# Patient Record
Sex: FEMALE | Race: WHITE | NOT HISPANIC OR LATINO | Employment: FULL TIME | URBAN - METROPOLITAN AREA
[De-identification: names, ages, dates, MRNs, and addresses within clinical notes are randomized per-mention and may not be internally consistent; named-entity substitution may affect disease eponyms.]

---

## 2017-07-26 ENCOUNTER — LAB REQUISITION (OUTPATIENT)
Dept: LAB | Facility: HOSPITAL | Age: 37
End: 2017-07-26
Payer: COMMERCIAL

## 2017-07-26 DIAGNOSIS — Z01.419 ENCOUNTER FOR GYNECOLOGICAL EXAMINATION WITHOUT ABNORMAL FINDING: ICD-10-CM

## 2017-07-26 PROCEDURE — G0145 SCR C/V CYTO,THINLAYER,RESCR: HCPCS | Performed by: OBSTETRICS & GYNECOLOGY

## 2017-08-02 LAB
LAB AP GYN PRIMARY INTERPRETATION: NORMAL
Lab: NORMAL

## 2017-08-20 ENCOUNTER — ANESTHESIA EVENT (OUTPATIENT)
Dept: PERIOP | Facility: HOSPITAL | Age: 37
End: 2017-08-20
Payer: COMMERCIAL

## 2017-08-21 ENCOUNTER — ANESTHESIA (OUTPATIENT)
Dept: PERIOP | Facility: HOSPITAL | Age: 37
End: 2017-08-21
Payer: COMMERCIAL

## 2017-08-21 ENCOUNTER — HOSPITAL ENCOUNTER (OUTPATIENT)
Facility: HOSPITAL | Age: 37
Setting detail: OUTPATIENT SURGERY
Discharge: HOME | End: 2017-08-21
Attending: OBSTETRICS & GYNECOLOGY | Admitting: OBSTETRICS & GYNECOLOGY
Payer: COMMERCIAL

## 2017-08-21 VITALS
TEMPERATURE: 97.5 F | SYSTOLIC BLOOD PRESSURE: 95 MMHG | HEIGHT: 63 IN | WEIGHT: 122 LBS | RESPIRATION RATE: 16 BRPM | HEART RATE: 59 BPM | OXYGEN SATURATION: 100 % | DIASTOLIC BLOOD PRESSURE: 56 MMHG | BODY MASS INDEX: 21.62 KG/M2

## 2017-08-21 DIAGNOSIS — Z30.2 ENCOUNTER FOR STERILIZATION: ICD-10-CM

## 2017-08-21 LAB — EXT PREGNANCY TEST URINE: NEGATIVE

## 2017-08-21 PROCEDURE — 88302 TISSUE EXAM BY PATHOLOGIST: CPT | Performed by: OBSTETRICS & GYNECOLOGY

## 2017-08-21 PROCEDURE — 81025 URINE PREGNANCY TEST: CPT | Performed by: ANESTHESIOLOGY

## 2017-08-21 RX ORDER — OXYCODONE HYDROCHLORIDE AND ACETAMINOPHEN 5; 325 MG/1; MG/1
1-2 TABLET ORAL EVERY 4 HOURS PRN
Qty: 20 TABLET | Refills: 0 | Status: SHIPPED | OUTPATIENT
Start: 2017-08-21 | End: 2017-08-31

## 2017-08-21 RX ORDER — KETOROLAC TROMETHAMINE 30 MG/ML
INJECTION, SOLUTION INTRAMUSCULAR; INTRAVENOUS AS NEEDED
Status: DISCONTINUED | OUTPATIENT
Start: 2017-08-21 | End: 2017-08-21 | Stop reason: SURG

## 2017-08-21 RX ORDER — ROCURONIUM BROMIDE 10 MG/ML
INJECTION, SOLUTION INTRAVENOUS AS NEEDED
Status: DISCONTINUED | OUTPATIENT
Start: 2017-08-21 | End: 2017-08-21 | Stop reason: SURG

## 2017-08-21 RX ORDER — GLYCOPYRROLATE 0.2 MG/ML
INJECTION INTRAMUSCULAR; INTRAVENOUS AS NEEDED
Status: DISCONTINUED | OUTPATIENT
Start: 2017-08-21 | End: 2017-08-21 | Stop reason: SURG

## 2017-08-21 RX ORDER — SODIUM CHLORIDE 9 MG/ML
125 INJECTION, SOLUTION INTRAVENOUS CONTINUOUS
Status: DISCONTINUED | OUTPATIENT
Start: 2017-08-21 | End: 2017-08-21 | Stop reason: HOSPADM

## 2017-08-21 RX ORDER — FENTANYL CITRATE/PF 50 MCG/ML
50 SYRINGE (ML) INJECTION
Status: DISCONTINUED | OUTPATIENT
Start: 2017-08-21 | End: 2017-08-21 | Stop reason: HOSPADM

## 2017-08-21 RX ORDER — PROPOFOL 10 MG/ML
INJECTION, EMULSION INTRAVENOUS AS NEEDED
Status: DISCONTINUED | OUTPATIENT
Start: 2017-08-21 | End: 2017-08-21 | Stop reason: SURG

## 2017-08-21 RX ORDER — ONDANSETRON 2 MG/ML
INJECTION INTRAMUSCULAR; INTRAVENOUS AS NEEDED
Status: DISCONTINUED | OUTPATIENT
Start: 2017-08-21 | End: 2017-08-21 | Stop reason: SURG

## 2017-08-21 RX ORDER — ONDANSETRON 2 MG/ML
4 INJECTION INTRAMUSCULAR; INTRAVENOUS EVERY 6 HOURS PRN
Status: DISCONTINUED | OUTPATIENT
Start: 2017-08-21 | End: 2017-08-21 | Stop reason: HOSPADM

## 2017-08-21 RX ORDER — FENTANYL CITRATE 50 UG/ML
INJECTION, SOLUTION INTRAMUSCULAR; INTRAVENOUS AS NEEDED
Status: DISCONTINUED | OUTPATIENT
Start: 2017-08-21 | End: 2017-08-21 | Stop reason: SURG

## 2017-08-21 RX ORDER — OXYCODONE HYDROCHLORIDE AND ACETAMINOPHEN 5; 325 MG/1; MG/1
2 TABLET ORAL EVERY 4 HOURS PRN
Status: DISCONTINUED | OUTPATIENT
Start: 2017-08-21 | End: 2017-08-21 | Stop reason: HOSPADM

## 2017-08-21 RX ORDER — OXYCODONE HYDROCHLORIDE AND ACETAMINOPHEN 5; 325 MG/1; MG/1
1 TABLET ORAL EVERY 4 HOURS PRN
Status: DISCONTINUED | OUTPATIENT
Start: 2017-08-21 | End: 2017-08-21 | Stop reason: HOSPADM

## 2017-08-21 RX ORDER — LIDOCAINE HYDROCHLORIDE 10 MG/ML
INJECTION, SOLUTION INFILTRATION; PERINEURAL AS NEEDED
Status: DISCONTINUED | OUTPATIENT
Start: 2017-08-21 | End: 2017-08-21 | Stop reason: SURG

## 2017-08-21 RX ORDER — IBUPROFEN 600 MG/1
600 TABLET ORAL EVERY 6 HOURS PRN
Status: DISCONTINUED | OUTPATIENT
Start: 2017-08-21 | End: 2017-08-21 | Stop reason: HOSPADM

## 2017-08-21 RX ORDER — MEPERIDINE HYDROCHLORIDE 50 MG/ML
12.5 INJECTION INTRAMUSCULAR; INTRAVENOUS; SUBCUTANEOUS ONCE AS NEEDED
Status: DISCONTINUED | OUTPATIENT
Start: 2017-08-21 | End: 2017-08-21 | Stop reason: HOSPADM

## 2017-08-21 RX ORDER — ONDANSETRON 2 MG/ML
4 INJECTION INTRAMUSCULAR; INTRAVENOUS ONCE AS NEEDED
Status: DISCONTINUED | OUTPATIENT
Start: 2017-08-21 | End: 2017-08-21 | Stop reason: HOSPADM

## 2017-08-21 RX ORDER — BUPIVACAINE HYDROCHLORIDE 5 MG/ML
INJECTION, SOLUTION PERINEURAL AS NEEDED
Status: DISCONTINUED | OUTPATIENT
Start: 2017-08-21 | End: 2017-08-21 | Stop reason: HOSPADM

## 2017-08-21 RX ORDER — MIDAZOLAM HYDROCHLORIDE 1 MG/ML
INJECTION INTRAMUSCULAR; INTRAVENOUS AS NEEDED
Status: DISCONTINUED | OUTPATIENT
Start: 2017-08-21 | End: 2017-08-21 | Stop reason: SURG

## 2017-08-21 RX ORDER — IBUPROFEN 600 MG/1
600 TABLET ORAL EVERY 6 HOURS PRN
Qty: 30 TABLET | Refills: 0 | Status: SHIPPED | OUTPATIENT
Start: 2017-08-21 | End: 2018-05-20

## 2017-08-21 RX ADMIN — ONDANSETRON HYDROCHLORIDE 4 MG: 2 INJECTION, SOLUTION INTRAVENOUS at 13:10

## 2017-08-21 RX ADMIN — IBUPROFEN 600 MG: 600 TABLET, FILM COATED ORAL at 17:01

## 2017-08-21 RX ADMIN — SODIUM CHLORIDE 125 ML/HR: 0.9 INJECTION, SOLUTION INTRAVENOUS at 15:36

## 2017-08-21 RX ADMIN — GLYCOPYRROLATE 0.8 MG: 0.2 INJECTION, SOLUTION INTRAMUSCULAR; INTRAVENOUS at 14:03

## 2017-08-21 RX ADMIN — FENTANYL CITRATE 50 MCG: 50 INJECTION INTRAMUSCULAR; INTRAVENOUS at 14:31

## 2017-08-21 RX ADMIN — MIDAZOLAM HYDROCHLORIDE 2 MG: 1 INJECTION, SOLUTION INTRAMUSCULAR; INTRAVENOUS at 12:49

## 2017-08-21 RX ADMIN — NEOSTIGMINE METHYLSULFATE 4 MG: 1 INJECTION, SOLUTION INTRAMUSCULAR; INTRAVENOUS; SUBCUTANEOUS at 14:03

## 2017-08-21 RX ADMIN — ONDANSETRON 4 MG: 2 INJECTION INTRAMUSCULAR; INTRAVENOUS at 15:09

## 2017-08-21 RX ADMIN — KETOROLAC TROMETHAMINE 30 MG: 30 INJECTION, SOLUTION INTRAMUSCULAR at 13:52

## 2017-08-21 RX ADMIN — DEXAMETHASONE SODIUM PHOSPHATE 4 MG: 10 INJECTION INTRAMUSCULAR; INTRAVENOUS at 13:10

## 2017-08-21 RX ADMIN — SODIUM CHLORIDE: 0.9 INJECTION, SOLUTION INTRAVENOUS at 13:16

## 2017-08-21 RX ADMIN — FENTANYL CITRATE 50 MCG: 50 INJECTION INTRAMUSCULAR; INTRAVENOUS at 14:24

## 2017-08-21 RX ADMIN — FENTANYL CITRATE 100 MCG: 50 INJECTION, SOLUTION INTRAMUSCULAR; INTRAVENOUS at 12:59

## 2017-08-21 RX ADMIN — PROPOFOL 200 MG: 10 INJECTION, EMULSION INTRAVENOUS at 12:59

## 2017-08-21 RX ADMIN — ROCURONIUM BROMIDE 50 MG: 10 INJECTION, SOLUTION INTRAVENOUS at 12:59

## 2017-08-21 RX ADMIN — LIDOCAINE HYDROCHLORIDE 60 MG: 10 INJECTION, SOLUTION INFILTRATION; PERINEURAL at 12:59

## 2017-08-21 RX ADMIN — SODIUM CHLORIDE 125 ML/HR: 0.9 INJECTION, SOLUTION INTRAVENOUS at 10:24

## 2017-09-20 ENCOUNTER — TRANSCRIBE ORDERS (OUTPATIENT)
Dept: ADMINISTRATIVE | Facility: HOSPITAL | Age: 37
End: 2017-09-20

## 2017-09-20 DIAGNOSIS — C77.9: Primary | ICD-10-CM

## 2017-09-20 DIAGNOSIS — C50.912: Primary | ICD-10-CM

## 2017-09-26 ENCOUNTER — HOSPITAL ENCOUNTER (OUTPATIENT)
Dept: RADIOLOGY | Facility: HOSPITAL | Age: 37
Discharge: HOME | End: 2017-09-26
Attending: OBSTETRICS & GYNECOLOGY
Payer: COMMERCIAL

## 2017-09-26 ENCOUNTER — DOCTOR'S OFFICE (OUTPATIENT)
Dept: URBAN - METROPOLITAN AREA CLINIC 137 | Facility: CLINIC | Age: 37
Setting detail: OPHTHALMOLOGY
End: 2017-09-26
Payer: COMMERCIAL

## 2017-09-26 DIAGNOSIS — C77.9: ICD-10-CM

## 2017-09-26 DIAGNOSIS — H52.13: ICD-10-CM

## 2017-09-26 DIAGNOSIS — H52.222: ICD-10-CM

## 2017-09-26 DIAGNOSIS — C50.912: ICD-10-CM

## 2017-09-26 PROCEDURE — 76856 US EXAM PELVIC COMPLETE: CPT

## 2017-09-26 PROCEDURE — 76830 TRANSVAGINAL US NON-OB: CPT

## 2017-09-26 PROCEDURE — 92004 COMPRE OPH EXAM NEW PT 1/>: CPT | Performed by: OPHTHALMOLOGY

## 2017-09-26 ASSESSMENT — REFRACTION_OUTSIDERX
OS_VA2: 20/20(J1+)
OU_VA: 20/
OS_VA1: 20/20
OD_VA1: 20/20
OD_VA3: 20/
OD_CYLINDER: SPH
OS_CYLINDER: +1.25
OD_VA2: 20/20(J1+)
OD_SPHERE: +0.25
OS_SPHERE: -0.75
OS_AXIS: 090
OS_VA3: 20/

## 2017-09-26 ASSESSMENT — REFRACTION_MANIFEST
OS_VA1: 20/
OS_VA2: 20/
OS_VA1: 20/
OS_VA3: 20/
OD_VA2: 20/
OD_VA3: 20/
OD_VA3: 20/
OS_VA2: 20/
OU_VA: 20/
OU_VA: 20/
OS_VA3: 20/
OD_VA1: 20/
OD_VA2: 20/
OD_VA1: 20/

## 2017-09-26 ASSESSMENT — CONFRONTATIONAL VISUAL FIELD TEST (CVF)
OS_FINDINGS: FULL
OD_FINDINGS: FULL

## 2017-09-26 ASSESSMENT — REFRACTION_CURRENTRX
OD_OVR_VA: 20/
OD_OVR_VA: 20/
OS_OVR_VA: 20/
OD_OVR_VA: 20/

## 2017-09-26 ASSESSMENT — KERATOMETRY
OS_K1POWER_DIOPTERS: 44.50
OD_AXISANGLE_DEGREES: 088
OD_K1POWER_DIOPTERS: 44.75
OS_K2POWER_DIOPTERS: 46.25
OD_K2POWER_DIOPTERS: 46.00
OS_AXISANGLE_DEGREES: 086

## 2017-09-26 ASSESSMENT — SPHEQUIV_DERIVED: OS_SPHEQUIV: 0

## 2017-09-26 ASSESSMENT — REFRACTION_AUTOREFRACTION
OD_SPHERE: +0.25
OS_CYLINDER: +1.50
OD_CYLINDER: SPH
OS_AXIS: 086
OS_SPHERE: -0.75

## 2017-09-26 ASSESSMENT — VISUAL ACUITY
OD_BCVA: 20/40
OS_BCVA: 20/20

## 2017-09-26 ASSESSMENT — AXIALLENGTH_DERIVED: OS_AL: 22.9227

## 2018-01-11 NOTE — MISCELLANEOUS
Message   Recorded as Task   Date: 04/26/2016 09:53 PM, Created By: Prosper Velasquez   Task Name: Call Patient with results   Assigned To: Lala Welch   Regarding Patient: Adán Robles, Status: Active   Comment:    Lala Welch - 26 Apr 2016 9:53 PM     Patient Phone: [phone]      Pt needs a 3 hr GTT, 1 hr was 136 and hx of GDM  Plts at 132 ( normal 130-400) for this lab  Please call pt  to get 3 hr GTT, order in [employer], please see care coordination note, pt may be transferring out  Thanks   FroilanJan - 28 Apr 2016 11:04 AM     TASK EDITED  L/M for pt to have 3 HR GTT done  lab slip mailed to pt with instructions  Active Problems    1  Abnormal glucose tolerance test (790 22) (R73 02)   2  Amenorrhea (626 0) (N91 2)   3  Supervision of normal pregnancy in first trimester (V22 1) (Z34 81)    Current Meds   1  Prenatal TABS; Therapy: (Recorded:20Apr2016) to Recorded    Allergies    1   No Known Drug Allergies    Signatures   Electronically signed by : Venu Escobedo RN; Apr 28 2016 11:04AM EST                       (Author)

## 2018-01-15 NOTE — PROGRESS NOTES
2016         RE: Nina Ferro                                    To: Noreen RAVEN Mccloud    MR#: [id number]   : [address]: [id number]                             Fax: [phone]   (Exam #: [id number])      The LMP of this 30year old,  G3, P1-0-1-1 patient was unknown, her   working JOSE is DEC 6 2016 and the current gestational age is 17 weeks 3   days by 1st Trimester Sono  A sonographic examination was performed on 2016 using real time equipment  The ultrasound examination was performed   using abdominal technique  The patient has a BMI of 21 6  Her blood   pressure today was 121/80  Multiple longitudinal and transverse sections revealed a waggoner   intrauterine pregnancy with the fetus in transverse presentation  The   placenta is anterior in implantation, grade 0 in appearance  Cardiac motion was observed at 162 bpm       INDICATIONS      first trimester genetic screening   short interval pregnancy   diabetes, gestation-previous pregnancy   advanced maternal age      Exam Types      Level I      RESULTS      Fetus # 1 of 1   Transverse presentation   Fetal growth appeared normal      MEASUREMENTS (* Included In Average GA)      CRL              8 2 cm        13 weeks 6 days*   Nuchal Trans    1 70 mm      THE AVERAGE GESTATIONAL AGE is 13 weeks 6 days +/- 7 days  UTERINE ARTERIES                                  S/D   PI    RI    NOTCH       Left Uterine Artery        2 25  0 93  0 56       Right Uterine Artery       2 46  1 00  0 59      ANATOMY COMMENTS      Anatomic detail is limited at this gestational age  The yolk sac was not   noted  The fetal cranium appeared normal in shape and the nuchal   translucency was normal in size (1 7mm)  The nasal bone appears to be   present  The intracranial anatomy was unremarkable  Evaluation of the   spine revealed no obvious evidence for a neural tube defect    Anatomy of   the fetal thorax appeared within normal limits  The cardiac rhythm was   regular  Within the abdomen, stomach & bladder were visualized and the   abdominal wall appeared intact  A three vessel cord appears to be present  Active movement of the fetal body & extremities was seen  There is no   suspicion of a subchorionic bleed  The placental cord insertion was   normal    The uterine artery Dopplers are normal for this gestational age  There is no suspicion of a uterine myoma  Free fluid is not seen in the   posterior cul-de-sac  ADNEXA      The left ovary appeared normal and measured 3 5 x 2 7 x 1 6 cm with a   volume of 7 9 cc  The right ovary appeared normal and measured 2 5 x 1 7 x   0 9 cm with a volume of 2 0 cc       AMNIOTIC FLUID      Amniotic Fluid: Normal      IMPRESSION      Landon IUP   13 weeks and 6 days by this ultrasound  (JOSE=DEC 8 2016)   13 weeks and 3 days by 1st Tri Sono  (JOSE=DEC 11 2016)   Transverse presentation   Fetal growth appeared normal   Regular fetal heart rate of 162 bpm   Anterior placenta      GENERAL COMMENT      I had the pleasure of seeing Rhiannon Contreras in the  Center on    for a sequential screen  She is a 18-year-old  3 para 26 with a   working due date of   She will be 29years of age at the time   of delivery  She has a history of gestational diabetes with her last   pregnancy  She has a short interpregnancy interval of less than one year  She denies any other medical surgical or family problems  She did have a   vaginal delivery in 2015 without any complications she denies any   tobacco or alcohol use and denies any medication allergies  Her pregnancy   has been uncomplicated to date  She denies any pregnancy complications   such as vaginal bleeding, leakage of fluid or contractions  Her BMI is 21  Her blood pressure today was 121/80 mmHg        RISK FACTORS: History of gestational diabetes, short interpregnancy   interval, advanced maternal age Today's ultrasound showed a viable fetus in a variable presentation  The   amniotic fluid and overall fetal growth appeared normal  The placenta is   anterior  in location and there is no evidence of a placenta previa  There   were no obvious anomalies seen in the fetus today  Down syndrome screening   was reassuring as the nuchal translucency was normal in size and the nasal   bone was present  The uterine artery Doppler flow studies were normal   bilaterally  Maternal ovaries are seen bilaterally and appeared normal    There were no subchorionic hematomas or uterine myomas  The patient appears well-nourished, well-developed and oriented to person   place and time  Her abdomen was soft and nontender  The uterus was gravid   and nontender  The fetal cranium was intact  There was a normal midline to   the fetal cranium, and the choroid plexi were seen bilaterally  A fetal   heart, a fetal stomach, and the fetal bladder were all seen today and   appeared unremarkable  The ventral wall appeared intact with no evidence   of an abdominal wall defect  There was a normal abdominal cord insertion   seen  All extremities were seen including 2 arms and 2 legs  The spine   showed no obvious neural tube defect  The fetal heart rate and rhythm were   within normal range  Active fetal movements were seen today  She opted for the noninvasive prenatal test today which has a sensitivity   of over 99%  IMPORTANT NEW FINDINGS ON TODAY'S ULTRASOUND: None, reassuring ultrasound   today in the  [address] [place] [address] Rd: Lisa Joiner had a reassuring ultrasound today  The fetus appears to   be growing well  There were no obvious birth defects  The Down syndrome   screening was reassuring as the nuchal translucency measured 1 7 mm in an   enlarged midsagittal plane and the nasal bone was present  The uterine   artery Doppler flow studies were normal as well   She did go on to have the   noninvasive prenatal test performed today as well  She will return in 7   weeks for her full level II ultrasound  Thank you kindly for this referral          Total face-to-face time with the patient, excluding ultrasound time was 15   minutes with more than 50% of the time devoted to counseling and   coordination of care  Thank you very much for allowing me to participate in the care of your   patient  If you have any questions or concerns about today's visit, please   do not hesitate to call me  Sincerely,      RAVEN Ruiz  Maternal Fetal Medicine      CAT Wells M D     Maternal-Fetal Medicine   Electronically signed 06/08/16 15:20

## 2018-01-16 NOTE — RESULT NOTES
Message   Pt needs a 3 hr GTT, 1 hr was 136 and hx of GDM  Plts at 132 ( normal 130-400) for this lab  Please call pt  to get 3 hr GTT, order in [employer], please see care coordination note, pt may be transferring out  Thanks     Verified Results  (1) PRENATAL PANEL [id number] 01:05PM Estefanía Arzate     Test Name Result Flag Reference   BASOPHILS ABSOLUTE COUNT 0 00 Thousands/µL  0 00-0 10   EOSINOPHILS ABSOLUTE COUNT 0 00 Thousand/µL  0 00-0 61   LYMPHOCYTES ABSOLUTE COUNT 1 90 Thousands/µL  0 60-4 47   MONOCYTES ABSOLUTE COUNT 0 30 Thousand/µL  0 17-1 22   NEUTROPHILS ABSOLUTE COUNT 3 50 Thousands/µL  1 85-7 62   BASOPHILS RELATIVE PERCENT 0 %  0-1   EOSINOPHILS RELATIVE PERCENT 1 %  0-6   HEMATOCRIT 35 6 % L 37 0-47 0   HEMOGLOBIN 11 9 g/dL L 12 0-16 0   LYMPHOCYTES RELATIVE PERCENT 33 %  14-44   MCH 28 3 pg  27 0-31 0   MCHC 33 6 g/dL  31 4-37 4   MCV 84 fL  82-98   MONOCYTES RELATIVE PERCENT 5 %  4-12   nRBC AUTOMATED 0 /100 WBCs     PLATELET COUNT 729 Thousands/uL  130-400   MPV 10 6 fL  8 9-12 7   RBC COUNT 4 22 Million/uL  4 20-5  40   RDW 13 4 %  11 6-15 1   NEUTROPHILS RELATIVE PERCENT 61 %  43-75   WBC COUNT 5 70 Thousand/uL  4 80-10 80   WBC ADJUSTED 5 70 Thousand/uL  4 80-10 80       Signatures   Electronically signed by : CESAR Orozco;  Apr 26 2016  9:53PM EST                       (Author)

## 2018-01-17 NOTE — PROGRESS NOTES
2016         RE: Ann Portillo                                    To: RAVEN Curtis    MR#: [id number]   : [address]: [id number]                             Fax: [phone]   (Exam #: [id number])      The LMP of this 29year old,  G3, P1-0-1-1 patient was unknown, her   working JOSE is DEC 6 2016 and the current gestational age is 25 weeks 1   day by [address] [place] [address] [address]  A sonographic examination was performed on 2016 using real time equipment  The ultrasound examination was   performed using abdominal & vaginal techniques  The patient has a BMI of   22 5  Her blood pressure today was 100/56        Earliest ultrasound found in her record: 16  7w3d 16EDD      Cardiac motion was observed at 157 bpm       INDICATIONS      short interval pregnancy   diabetes, gestation-previous pregnancy   advanced maternal age   fetal anatomical survey      Exam Types      Transvaginal   LEVEL II      RESULTS      Fetus # 1 of 1   Vertex presentation   Fetal growth appeared normal   Placenta Location = Anterior   No placenta previa   Placenta Grade = II      MEASUREMENTS (* Included In Average GA)      AC              16 3 cm        21 weeks 1 day * (72%)   BPD              5 2 cm        21 weeks 5 days* (93%)   HC              19 2 cm        21 weeks 2 days* (83%)   Femur            3 4 cm        21 weeks 0 days* (61%)      Nuchal Fold      4 1 mm      Humerus          3 4 cm        21 weeks 4 days  (85%)   Radius           2 9 cm        22 weeks 1 day   Ulna             3 1 cm        21 weeks 6 days   Tibia            3 1 cm        21 weeks 2 days  (83%)   Fibula           2 9 cm        19 weeks 6 days   Foot             3 5 cm        20 weeks 6 days      Cerebellum       2 2 cm        21 weeks 1 day   Biorbit          3 3 cm        21 weeks 2 days   CisternaMagna    7 4 mm      HC/AC           1 18   FL/AC           0 21   FL/BPD          0 67   EFW (Ac/Fl/Hc)   404 grams - 0 lbs 14 oz      THE AVERAGE GESTATIONAL AGE is 21 weeks 2 days +/- 10 days  AMNIOTIC FLUID         Largest Vertical Pocket = 3 5 cm   Amniotic Fluid: Normal      CERVICAL EVALUATION      SUPINE      Cervical Length: 3 30 cm      OTHER TEST RESULTS           Funneling?: No             Dynamic Changes?: No        Resp  To TFP?: No      ANATOMY      Head                                    Normal   Face/Neck                               Normal   Th  Cav  Normal   Heart                                   Normal   Abd  Cav  Normal   Stomach                                 Normal   Right Kidney                            Normal   Left Kidney                             Normal   Bladder                                 Normal   Abd  Wall                               Normal   Spine                                   Normal   Extrems                                 Normal   Genitalia                               Normal   Placenta                                Normal   Umbl  Cord                              Normal   Uterus                                  Normal   PCI                                     Normal      ANATOMY DETAILS      Visualized Appearing Sonographically Normal:   HEAD: (Calvarium, BPD Level, Cavum, Lateral Ventricles, Choroid Plexus,   Cerebellum, Cisterna Magna);    FACE/NECK: (Neck, Nuchal Fold, Profile,   Orbits, Nose/Lips, Palate, Face);    TH  CAV : (Diaphragm); HEART:   (Four Chamber View, Proximal Left Outflow, Proximal Right Outflow, 3   Vessel Trachea, Short Axis of Greater Vessels, Ductal Arch, Aortic Arch,   Interventricular Septum, Interatrial Septum, Cardiac Axis, Cardiac   Position);    ABD  CAV , STOMACH, RIGHT KIDNEY, LEFT KIDNEY, BLADDER, ABD     WALL, SPINE: (Cervical Spine, Thoracic Spine, Lumbar Spine, Sacrum);      EXTREMS: (Lt Humerus, Rt Humerus, Lt Forearm, Rt Forearm, Lt Hand, Rt   Hand, Lt Femur, Rt Femur, Lt Low Leg, Rt Low Leg, Lt Foot, Rt Foot);      GENITALIA, PLACENTA, UMBL  CORD, UTERUS, PCI      ADNEXA      The left ovary was not visualized  The right ovary appeared normal and   measured 3 1 x 2 5 x 1 2 cm with a volume of 4 9 cc  IMPRESSION      Landon IUP   21 weeks and 2 days by this ultrasound  (JOSE=DEC 3 2016)   20 weeks and 1 day by 1st Tri Sono  (JOSE=DEC 11 2016)   Vertex presentation   Fetal growth appeared normal   Normal anatomy survey   Regular fetal heart rate of 157 bpm   Anterior placenta   No placenta previa      GENERAL COMMENT      On exam today the patient appears well, in no acute distress, and denies   any complaints  Her abdomen is non-tender  The patient had noninvasive prenatal testing through [facility]   at [address] [place] [address] using the [facility]) Prenatal test   Her results were   normal, placing her in a low risk category for aneuploidy of chromosomes   21, 18, and 13  The sensitivity of detecting Trisomy 21 with this test is   99 1% with a specificity of 99 9%  The sensitivity of detecting Trisomy   18 is 97 4% with a specificity of 99 8%  Her negative result is   reassuring to rule out the aforementioned Trisomies  The fetal anatomic survey is complete  There is no sonographic evidence   of fetal abnormalities at this time  Good fetal movement and tone are   seen  The amniotic fluid volume appears normal   The placenta is anterior   and it appears sonographically normal   A transvaginal ultrasound was   performed to assess the cervix, which was not seen well transabdominally  The cervical length was 3 3 centimeters, which is normal for the current   gestational age  There was no significant funneling or dynamic changes   appreciated  The patient was informed of today's findings and all of her   questions were answered  The limitations of ultrasound were reviewed with   the patient, which she accepts        The patient had a normal three-hour glucose tolerance test in the first   trimester  A repeat 3 hour glucose tolerance test is recommended at   around 26 weeks  Recommend the patient return in 8-10 weeks for a growth ultrasound   secondary to advanced maternal age, short interval pregnancy interval, and   history of gestational diabetes  Please note, in addition to the time spent discussing the results of the   ultrasound, I spent approximately 10 minutes of face-to-face time with the   patient, greater than 50% of which was spent in counseling and the   coordination of care for this patient  Thank you very much for allowing us to participate in the care of this   very nice patient  Should you have any questions, please do not hesitate   to contact our office  CAT Carpenter M D     Electronically signed 07/25/16 12:09

## 2018-05-20 ENCOUNTER — HOSPITAL ENCOUNTER (EMERGENCY)
Facility: HOSPITAL | Age: 38
Discharge: HOME | End: 2018-05-20
Attending: EMERGENCY MEDICINE
Payer: COMMERCIAL

## 2018-05-20 VITALS
OXYGEN SATURATION: 97 % | HEIGHT: 63 IN | HEART RATE: 62 BPM | SYSTOLIC BLOOD PRESSURE: 115 MMHG | WEIGHT: 121.91 LBS | DIASTOLIC BLOOD PRESSURE: 68 MMHG | BODY MASS INDEX: 21.6 KG/M2 | RESPIRATION RATE: 18 BRPM

## 2018-05-20 DIAGNOSIS — R19.7 BLOODY DIARRHEA: Primary | ICD-10-CM

## 2018-05-20 DIAGNOSIS — R10.9 ABDOMINAL PAIN: ICD-10-CM

## 2018-05-20 LAB
ALBUMIN SERPL BCP-MCNC: 4 G/DL (ref 3.5–5)
ALP SERPL-CCNC: 49 U/L (ref 46–116)
ALT SERPL W P-5'-P-CCNC: 19 U/L (ref 12–78)
ANION GAP SERPL CALCULATED.3IONS-SCNC: 4 MMOL/L (ref 4–13)
AST SERPL W P-5'-P-CCNC: 12 U/L (ref 5–45)
BASOPHILS # BLD AUTO: 0.02 THOUSANDS/ΜL (ref 0–0.1)
BASOPHILS NFR BLD AUTO: 0 % (ref 0–1)
BILIRUB SERPL-MCNC: 1.4 MG/DL (ref 0.2–1)
BUN SERPL-MCNC: 13 MG/DL (ref 5–25)
CALCIUM SERPL-MCNC: 8.7 MG/DL (ref 8.3–10.1)
CHLORIDE SERPL-SCNC: 107 MMOL/L (ref 100–108)
CO2 SERPL-SCNC: 27 MMOL/L (ref 21–32)
CREAT SERPL-MCNC: 0.77 MG/DL (ref 0.6–1.3)
EOSINOPHIL # BLD AUTO: 0.07 THOUSAND/ΜL (ref 0–0.61)
EOSINOPHIL NFR BLD AUTO: 1 % (ref 0–6)
ERYTHROCYTE [DISTWIDTH] IN BLOOD BY AUTOMATED COUNT: 12.7 % (ref 11.6–15.1)
GFR SERPL CREATININE-BSD FRML MDRD: 100 ML/MIN/1.73SQ M
GLUCOSE SERPL-MCNC: 103 MG/DL (ref 65–140)
HCT VFR BLD AUTO: 39 % (ref 34.8–46.1)
HGB BLD-MCNC: 13.4 G/DL (ref 11.5–15.4)
LIPASE SERPL-CCNC: 158 U/L (ref 73–393)
LYMPHOCYTES # BLD AUTO: 1.55 THOUSANDS/ΜL (ref 0.6–4.47)
LYMPHOCYTES NFR BLD AUTO: 20 % (ref 14–44)
MCH RBC QN AUTO: 28.3 PG (ref 26.8–34.3)
MCHC RBC AUTO-ENTMCNC: 34.4 G/DL (ref 31.4–37.4)
MCV RBC AUTO: 83 FL (ref 82–98)
MONOCYTES # BLD AUTO: 0.47 THOUSAND/ΜL (ref 0.17–1.22)
MONOCYTES NFR BLD AUTO: 6 % (ref 4–12)
NEUTROPHILS # BLD AUTO: 5.82 THOUSANDS/ΜL (ref 1.85–7.62)
NEUTS SEG NFR BLD AUTO: 73 % (ref 43–75)
NRBC BLD AUTO-RTO: 0 /100 WBCS
PLATELET # BLD AUTO: 141 THOUSANDS/UL (ref 149–390)
PMV BLD AUTO: 11.9 FL (ref 8.9–12.7)
POTASSIUM SERPL-SCNC: 4.2 MMOL/L (ref 3.5–5.3)
PROT SERPL-MCNC: 7.2 G/DL (ref 6.4–8.2)
RBC # BLD AUTO: 4.73 MILLION/UL (ref 3.81–5.12)
SODIUM SERPL-SCNC: 138 MMOL/L (ref 136–145)
WBC # BLD AUTO: 7.93 THOUSAND/UL (ref 4.31–10.16)

## 2018-05-20 PROCEDURE — 99285 EMERGENCY DEPT VISIT HI MDM: CPT

## 2018-05-20 PROCEDURE — 36415 COLL VENOUS BLD VENIPUNCTURE: CPT | Performed by: EMERGENCY MEDICINE

## 2018-05-20 PROCEDURE — 80053 COMPREHEN METABOLIC PANEL: CPT | Performed by: EMERGENCY MEDICINE

## 2018-05-20 PROCEDURE — 85025 COMPLETE CBC W/AUTO DIFF WBC: CPT | Performed by: EMERGENCY MEDICINE

## 2018-05-20 PROCEDURE — 83690 ASSAY OF LIPASE: CPT | Performed by: EMERGENCY MEDICINE

## 2018-05-20 RX ORDER — DICYCLOMINE HCL 20 MG
20 TABLET ORAL 2 TIMES DAILY
Qty: 20 TABLET | Refills: 0 | Status: SHIPPED | OUTPATIENT
Start: 2018-05-20 | End: 2018-05-20

## 2018-05-20 RX ORDER — DICYCLOMINE HCL 20 MG
20 TABLET ORAL ONCE
Status: COMPLETED | OUTPATIENT
Start: 2018-05-20 | End: 2018-05-20

## 2018-05-20 RX ORDER — DICYCLOMINE HCL 20 MG
20 TABLET ORAL 2 TIMES DAILY
Qty: 6 TABLET | Refills: 0 | Status: SHIPPED | OUTPATIENT
Start: 2018-05-20 | End: 2018-08-29 | Stop reason: ALTCHOICE

## 2018-05-20 RX ADMIN — DICYCLOMINE HYDROCHLORIDE 20 MG: 20 TABLET ORAL at 10:24

## 2018-05-20 NOTE — DISCHARGE INSTRUCTIONS
Acute Diarrhea   WHAT YOU NEED TO KNOW:   Acute diarrhea starts quickly and lasts a short time, usually 1 to 3 days  It can last up to 2 weeks  You may not be able to control your diarrhea  Acute diarrhea usually stops on its own  DISCHARGE INSTRUCTIONS:   Return to the emergency department if:   · You feel confused  · Your heartbeat is faster than normal      · Your eyes look deeply sunken, or you have no tears when you cry  · You urinate less than usual, or your urine is dark yellow  · You have blood or mucus in your stools  · You have severe abdominal pain  · You are unable to drink any liquids  Contact your healthcare provider if:   · Your symptoms do not get better with treatment  · You have a fever higher than 101 3°F (38 5°C)  · You have trouble eating and drinking because you are vomiting  · You are thirsty or have a dry mouth  · Your diarrhea does not get better in 7 days  · You have questions or concerns about your condition or care  Follow up with your healthcare provider as directed:  Write down your questions so you remember to ask them during your visits  Medicines:  · Diarrhea medicine  is an over-the-counter medicine that helps slow or stop your diarrhea  If you take other medicines, talk to your healthcare provider before you take diarrhea medicine  · Antibiotics  may be given to help treat an infection caused by bacteria  · Antiparasitics  may be given to treat an infection caused by parasites  · Take your medicine as directed  Contact your healthcare provider if you think your medicine is not helping or if you have side effects  Tell him of her if you are allergic to any medicine  Keep a list of the medicines, vitamins, and herbs you take  Include the amounts, and when and why you take them  Bring the list or the pill bottles to follow-up visits  Carry your medicine list with you in case of an emergency    Self-care:   · Drink liquids as directed  Liquids will help prevent dehydration caused by diarrhea  Ask your healthcare provider how much liquid to drink each day and which liquids are best for you  You may need to drink an oral rehydration solution (ORS)  An ORS has the right amounts of water, salts, and sugar you need to replace body fluids  You can buy an ORS at most grocery stores and pharmacies  · Eat foods that are easy to digest   Examples include rice, lentils, cereal, bananas, potatoes, and bread  It also includes some fruits (bananas, melon), well-cooked vegetables, and lean meats  Avoid foods high in fiber, fat, and sugar  Also avoid caffeine, alcohol, dairy, and red meat until your diarrhea is gone  Prevent acute diarrhea:   · Wash your hands often  Use soap and water  Wash your hands before you eat or prepare food  Also wash your hands after you use the bathroom  Use an alcohol-based hand gel when soap and water are not available  · Keep bathroom surfaces clean  This helps prevent the spread of germs that cause acute diarrhea  · Wash fruits and vegetables well before you eat them  This can help remove germs that cause diarrhea  If possible, remove the skin from fruits and vegetables, or cook them well before you eat them  · Cook meat as directed  ¨ Cook ground meat  to 160°F      ¨ Cook ground poultry, whole poultry, or cuts of poultry  to at least 165°F  Remove the meat from heat  Let it stand for 3 minutes before you eat it  ¨ Cook whole cuts of meat other than poultry  to at least 145°F  Remove the meat from heat  Let it stand for 3 minutes before you eat it  · Wash dishes that have touched raw meat with hot water and soap  This includes cutting boards, utensils, dishes, and serving containers  · Place raw or cooked meat in the refrigerator as soon as possible  Bacteria can grow in meat that is left at room temperature too long  · Do not eat raw or undercooked oysters, clams, or mussels  These foods may be contaminated and cause infection  · Drink filtered or treated water only when you travel  Do not put ice in your drinks  Drink bottled water whenever possible  © 2017 [employer] [employer] Information is for End User's use only and may not be sold, redistributed or otherwise used for commercial purposes  All illustrations and images included in CareNotes® are the copyrighted property of A D A M , Inc  or Jaya Burris  The above information is an  only  It is not intended as medical advice for individual conditions or treatments  Talk to your doctor, nurse or pharmacist before following any medical regimen to see if it is safe and effective for you

## 2018-05-20 NOTE — ED ATTENDING ATTESTATION
Carlos Oates MD, saw and evaluated the patient  I have discussed the patient with the resident/non-physician practitioner and agree with the resident's/non-physician practitioner's findings, Plan of Care, and MDM as documented in the resident's/non-physician practitioner's note, except where noted  All available labs and Radiology studies were reviewed  At this point I agree with the current assessment done in the Emergency Department  I have conducted an independent evaluation of this patient a history and physical is as follows:      Critical Care Time  CritCare Time    Procedures     39 yo female with bloating and gas pain since last night with multiple episodes of diarrhea and then this morning noted blood and clot noted  No anal sex, no sick contacts, no recent travel  Pt with hx of fissure, no hx of hemorrhoids  No pmh   vss, afebrile, lungs cta, rrr, abdomen soft nontender  Rectal exam, stool cx, labs

## 2018-05-20 NOTE — ED NOTES
RN at patients bedside with Dr Nuzhat Nichole for rectal examination          Junior Christiansen RN  05/20/18 1007

## 2018-05-21 NOTE — ED PROVIDER NOTES
History  Chief Complaint   Patient presents with    Rectal Bleeding     Patient states that last night she started with diarrhea and this morning she noticed bright red in her stool  HPI  Patient is a 60-year-old woman comes in for evaluation diarrhea with rectal bleeding  Patient states that yesterday, she noticed diarrhea which she had bright red blood  Again this morning  Was associated with abdominal cramping  No nausea or vomiting  Patient sources remote history of anal fissure when she was pregnant denies history of hemorrhoids, anal intercourse, recent travel  No one else is sick at home  None       Past Medical History:   Diagnosis Date    Admission for sterilization     today 8/21/2017    Lactating mother     10 months postpartum    Varicella     childhood       Past Surgical History:   Procedure Laterality Date    APPENDECTOMY      DENTAL SURGERY      wisdom    HERNIA REPAIR      umbilical    LA LAP,APPENDECTOMY N/A 7/27/2016    Procedure: LAPAROSCOPIC EXPLORATION; APPENDECTOMY LAPAROSCOPIC; UMBILICAL HERNIA REPAIR;  Surgeon: Channing Ganser, MD;  Location: [facility];  Service: General    LA LAP,RMV  ADNEXAL STRUCTURE Bilateral 8/21/2017    Procedure: SALPINGECTOMY;  Surgeon: Louis Lama MD;  Location: [facility];  Service: Gynecology    RHINOPLASTY         Family History   Problem Relation Age of Onset    Diabetes Father     Asthma Sister     Arthritis Maternal Grandfather     Cancer Paternal Grandmother      I have reviewed and agree with the history as documented  Social History   Substance Use Topics    Smoking status: Never Smoker    Smokeless tobacco: Never Used    Alcohol use 1 2 oz/week     2 Glasses of wine per week      Comment: 1 x weekly        Review of Systems   Constitutional: Negative  HENT: Negative  Eyes: Negative  Respiratory: Negative  Cardiovascular: Negative  Gastrointestinal: Positive for abdominal pain, blood in stool and diarrhea  Endocrine: Negative  Genitourinary: Negative  Musculoskeletal: Negative  Skin: Negative  Allergic/Immunologic: Negative  Neurological: Negative  Hematological: Negative  Psychiatric/Behavioral: Negative  Physical Exam  ED Triage Vitals [05/20/18 0931]   Temp Pulse Respirations Blood Pressure SpO2   -- 75 18 117/80 100 %      Temp src Heart Rate Source Patient Position - Orthostatic VS BP Location FiO2 (%)   -- Monitor Sitting Right arm --      Pain Score       7           Orthostatic Vital Signs  Vitals:    05/20/18 0931 05/20/18 1055 05/20/18 1110   BP: 117/80 115/67 115/68   Pulse: 75 58 62   Patient Position - Orthostatic VS: Sitting Lying Lying       Physical Exam   Constitutional: She is oriented to person, place, and time  She appears well-developed and well-nourished  No distress  HENT:   Head: Normocephalic and atraumatic  Right Ear: External ear normal    Left Ear: External ear normal    Mouth/Throat: Oropharynx is clear and moist    Eyes: Conjunctivae and EOM are normal  Pupils are equal, round, and reactive to light  Right eye exhibits no discharge  Left eye exhibits no discharge  No scleral icterus  Neck: Normal range of motion  Neck supple  No tracheal deviation present  No thyromegaly present  Cardiovascular: Normal rate, regular rhythm and intact distal pulses  Exam reveals no gallop and no friction rub  No murmur heard  Pulmonary/Chest: Effort normal and breath sounds normal  No stridor  No respiratory distress  She has no wheezes  She has no rales  Abdominal: Soft  Bowel sounds are normal  She exhibits no distension  There is no tenderness  There is no rebound and no guarding  Genitourinary: Rectal exam shows guaiac positive stool (No gross blood on finger)  Genitourinary Comments: No internal-external hemorrhoids appreciated   Musculoskeletal: Normal range of motion  She exhibits no edema or deformity     Neurological: She is alert and oriented to person, place, and time  No cranial nerve deficit  Skin: Skin is warm and dry  No rash noted  She is not diaphoretic  No erythema  Psychiatric: She has a normal mood and affect  Her behavior is normal  Thought content normal    Nursing note and vitals reviewed  ED Medications  Medications   dicyclomine (BENTYL) tablet 20 mg (20 mg Oral Given 5/20/18 1024)       Diagnostic Studies  Results Reviewed     Procedure Component Value Units Date/Time    Comprehensive metabolic panel [[id number]]  (Abnormal) Collected:  05/20/18 1002    Lab Status:  Final result Specimen:  Blood from Arm, Left Updated:  05/20/18 1030     Sodium 138 mmol/L      Potassium 4 2 mmol/L      Chloride 107 mmol/L      CO2 27 mmol/L      Anion Gap 4 mmol/L      BUN 13 mg/dL      Creatinine 0 77 mg/dL      Glucose 103 mg/dL      Calcium 8 7 mg/dL      AST 12 U/L      ALT 19 U/L      Alkaline Phosphatase 49 U/L      Total Protein 7 2 g/dL      Albumin 4 0 g/dL      Total Bilirubin 1 40 (H) mg/dL      eGFR 100 ml/min/1 73sq m     Narrative:         National Kidney Disease Education Program recommendations are as follows:  GFR calculation is accurate only with a steady state creatinine  Chronic Kidney disease less than 60 ml/min/1 73 sq  meters  Kidney failure less than 15 ml/min/1 73 sq  meters      Lipase [[id number]]  (Normal) Collected:  05/20/18 1002    Lab Status:  Final result Specimen:  Blood from Arm, Left Updated:  05/20/18 1030     Lipase 158 u/L     CBC and differential [[id number]]  (Abnormal) Collected:  05/20/18 1002    Lab Status:  Final result Specimen:  Blood from Arm, Left Updated:  05/20/18 1021     WBC 7 93 Thousand/uL      RBC 4 73 Million/uL      Hemoglobin 13 4 g/dL      Hematocrit 39 0 %      MCV 83 fL      MCH 28 3 pg      MCHC 34 4 g/dL      RDW 12 7 %      MPV 11 9 fL      Platelets 973 (L) Thousands/uL      nRBC 0 /100 WBCs      Neutrophils Relative 73 %      Lymphocytes Relative 20 %      Monocytes Relative 6 % Eosinophils Relative 1 %      Basophils Relative 0 %      Neutrophils Absolute 5 82 Thousands/µL      Lymphocytes Absolute 1 55 Thousands/µL      Monocytes Absolute 0 47 Thousand/µL      Eosinophils Absolute 0 07 Thousand/µL      Basophils Absolute 0 02 Thousands/µL     Stool Enteric Bacterial Panel by PCR [[id number]]     Lab Status:  No result Specimen:  Stool     Fecal leukocytes [[id number]]     Lab Status:  No result Specimen:  Stool                  No orders to display         Procedures  Procedures      Phone Consults  ED Phone Contact    ED Course                               MDM  Number of Diagnoses or Management Options  Abdominal pain:   Bloody diarrhea:   Diagnosis management comments: Well-appearing 41-year-old woman comes in for evaluation of rectal bleeding  Will get basic lab work to assess for hemoglobin  Patient is otherwise stable  Will be will give her Bentyl for relief of cramping abdominal pain  Will order stool studies to could be completed here in the emergency department if she has a bowel movement or as an outpatient  CritCare Time    Disposition  Final diagnoses:   Bloody diarrhea   Abdominal pain     Time reflects when diagnosis was documented in both MDM as applicable and the Disposition within this note     Time User Action Codes Description Comment    5/20/2018 10:47 AM Kathlynn Brain Add [A09] Dysentery     5/20/2018 10:47 AM Kathlynn Brain Add [R19 7] Bloody diarrhea     5/20/2018 10:47 AM Kathlynn Brain Modify [R19 7] Bloody diarrhea     5/20/2018 10:47 AM Kathlynn Brain Remove [W82] Dysentery     5/20/2018 10:58 AM Kathlynn Brain Add [R10 9] Abdominal pain       ED Disposition     ED Disposition Condition Comment    Discharge  Opal Jurist discharge to home/self care      Condition at discharge: Stable        Follow-up Information     Follow up With Specialties Details Why Contact Info Additional [address] Juliane Millan MD Colon and Rectal Surgery, Multidisciplinary   [facility] Emergency Department Emergency Medicine Go to As needed, If symptoms worsen [address]  [phone]  ED, [address] [facility] [address], , [address] [facility], [address]    Cherylynn Dubin, MD Family Medicine Schedule an appointment as soon as possible for a visit As needed, If symptoms worsen [address] [facility]   [phone]             Discharge Medication List as of 5/20/2018 11:11 AM      START taking these medications    Details   dicyclomine (BENTYL) 20 mg tablet Take 1 tablet (20 mg total) by mouth 2 (two) times a day for 6 doses, Starting Sun 5/20/2018, Until Wed 5/23/2018, Print             Outpatient Discharge Orders  Stool Enteric Bacterial Panel by PCR   Standing Status: Future  Standing Exp  Date: 05/20/19     FECAL LEUKOCYTES   Standing Status: Future  Standing Exp  Date: 05/20/19         ED Provider  Attending physically available and evaluated Jesse Acevedo I managed the patient along with the ED Attending      Electronically Signed by         Zach Driver MD  05/29/18 1634

## 2018-05-29 LAB
ADV 40+41 DNA STL QL NAA+NON-PROBE: NOT DETECTED
ASTRO TYP 1-8 RNA STL QL NAA+NON-PROBE: NOT DETECTED
C CAYETANENSIS DNA STL QL NAA+NON-PROBE: NOT DETECTED
C COLI+JEJ+UPSA DNA STL QL NAA+NON-PROBE: NOT DETECTED
C DIF TOX TCDA+TCDB STL QL NAA+NON-PROBE: NOT DETECTED
CRYPTOSP DNA STL QL NAA+NON-PROBE: NOT DETECTED
E COLI O157 DNA STL QL NAA+NON-PROBE: NORMAL
E HISTOLYT DNA STL QL NAA+NON-PROBE: NOT DETECTED
EAEC PAA PLAS AGGR+AATA ST NAA+NON-PRB: NOT DETECTED
EC STX1+STX2 GENES STL QL NAA+NON-PROBE: NOT DETECTED
EPEC EAE GENE STL QL NAA+NON-PROBE: NOT DETECTED
ETEC LTA+ST1A+ST1B TOX ST NAA+NON-PROBE: NOT DETECTED
G LAMBLIA DNA STL QL NAA+NON-PROBE: NOT DETECTED
Lab: NORMAL
NOROVIRUS GI+II RNA STL QL NAA+NON-PROBE: NOT DETECTED
P SHIGELLOIDES DNA STL QL NAA+NON-PROBE: NOT DETECTED
RVA RNA STL QL NAA+NON-PROBE: NOT DETECTED
S ENT+BONG DNA STL QL NAA+NON-PROBE: NOT DETECTED
SAPO I+II+IV+V RNA STL QL NAA+NON-PROBE: NOT DETECTED
SHIGELLA SP+EIEC IPAH ST NAA+NON-PROBE: NOT DETECTED
V CHOL+PARA+VUL DNA STL QL NAA+NON-PROBE: NOT DETECTED
V CHOLERAE DNA STL QL NAA+NON-PROBE: NOT DETECTED
WBC SPEC QL GRAM STN: NORMAL
Y ENTEROCOL DNA STL QL NAA+NON-PROBE: NOT DETECTED

## 2018-06-06 ENCOUNTER — TRANSCRIBE ORDERS (OUTPATIENT)
Dept: ADMINISTRATIVE | Facility: HOSPITAL | Age: 38
End: 2018-06-06

## 2018-06-06 DIAGNOSIS — N60.01 BILATERAL BREAST CYSTS: Primary | ICD-10-CM

## 2018-06-06 DIAGNOSIS — N60.02 BILATERAL BREAST CYSTS: Primary | ICD-10-CM

## 2018-06-14 ENCOUNTER — HOSPITAL ENCOUNTER (OUTPATIENT)
Dept: RADIOLOGY | Facility: HOSPITAL | Age: 38
Discharge: HOME | End: 2018-06-14
Attending: OBSTETRICS & GYNECOLOGY
Payer: COMMERCIAL

## 2018-06-14 DIAGNOSIS — N60.02 BILATERAL BREAST CYSTS: ICD-10-CM

## 2018-06-14 DIAGNOSIS — N60.01 BILATERAL BREAST CYSTS: ICD-10-CM

## 2018-06-14 PROCEDURE — 76377 3D RENDER W/INTRP POSTPROCES: CPT

## 2018-06-14 PROCEDURE — 76641 ULTRASOUND BREAST COMPLETE: CPT

## 2018-08-24 NOTE — PROGRESS NOTES
Subjective:      Patient ID: Von Cortez is a 41 y o  female  No chief complaint on file  HPI    The following portions of the patient's history were reviewed and updated as appropriate: allergies, current medications, past family history, past medical history, past social history, past surgical history and problem list     Review of Systems   Constitutional: Negative  Respiratory: Negative  Cardiovascular: Negative  Current Outpatient Prescriptions   Medication Sig Dispense Refill    dicyclomine (BENTYL) 20 mg tablet Take 1 tablet (20 mg total) by mouth 2 (two) times a day for 6 doses 6 tablet 0     No current facility-administered medications for this visit  Objective: There were no vitals taken for this visit  Physical Exam   Constitutional: She appears well-developed and well-nourished  Eyes: Conjunctivae are normal    Neck: Neck supple  No JVD present  No thyromegaly present  Cardiovascular: Normal rate, regular rhythm, normal heart sounds and intact distal pulses  Exam reveals no gallop and no friction rub  No murmur heard  Pulmonary/Chest: Effort normal and breath sounds normal  She has no wheezes  She has no rales  Abdominal: Soft  Bowel sounds are normal  She exhibits no distension  There is no tenderness  Musculoskeletal: She exhibits no edema  Assessment/Plan:    No problem-specific Assessment & Plan notes found for this encounter  There are no diagnoses linked to this encounter  No Follow-up on file         Leticia Avalos MD

## 2018-08-29 ENCOUNTER — OFFICE VISIT (OUTPATIENT)
Dept: FAMILY MEDICINE CLINIC | Facility: CLINIC | Age: 38
End: 2018-08-29
Payer: COMMERCIAL

## 2018-08-29 VITALS
BODY MASS INDEX: 22.86 KG/M2 | HEART RATE: 72 BPM | DIASTOLIC BLOOD PRESSURE: 68 MMHG | SYSTOLIC BLOOD PRESSURE: 116 MMHG | RESPIRATION RATE: 16 BRPM | HEIGHT: 63 IN | TEMPERATURE: 97.6 F | WEIGHT: 129 LBS

## 2018-08-29 DIAGNOSIS — Z00.00 WELL ADULT EXAM: Primary | ICD-10-CM

## 2018-08-29 DIAGNOSIS — F41.9 ANXIETY: ICD-10-CM

## 2018-08-29 DIAGNOSIS — Z13.6 SCREENING FOR CARDIOVASCULAR CONDITION: ICD-10-CM

## 2018-08-29 DIAGNOSIS — R63.5 WEIGHT GAIN: ICD-10-CM

## 2018-08-29 PROCEDURE — 99385 PREV VISIT NEW AGE 18-39: CPT | Performed by: INTERNAL MEDICINE

## 2018-08-29 NOTE — PROGRESS NOTES
Assessment/Plan:    No problem-specific Assessment & Plan notes found for this encounter  Diagnoses and all orders for this visit:    Well adult exam    Gestational diabetes mellitus (GDM), delivered  -     HEMOGLOBIN A1C W/ EAG ESTIMATION; Future    Weight gain  -     TSH, 3rd generation with Free T4 reflex; Future    Screening for cardiovascular condition  -     CBC and differential; Future  -     Comprehensive metabolic panel; Future  -     Lipid panel; Future    Anxiety  Comments:  She will check TSH and she will seek counselling if normal              Breast Cancer Screening:   Risks and Benefits discussed, does SBE  Osteoporosis Screening:  Risks and Benefits discussed, not indicated  Colorectal Cancer Screening:  Risks and Benefits discussed, not indicated  Cervical Cancer Screening:  Risks and Benefits discussed, managed by gyn  STD Testing:  Risks and Benefits discussed    Speciality Evaluation Advised:      Preventing Counseling:  Advice and education were given regarding nutrition, aerobic exercises, weight bearing exercises, cardiovascular risk reduction, fall risk reduction, and age appropriate supplements  The patient was counseled regarding instructions for management, risk factor reductions, prognosis, risks and benefits of treatment options, patient and family education, and importance of compliance with treatment  Return in about 1 year (around 8/29/2019)  Subjective:      Patient ID: Evelynn Rubinstein is a 41 y o  female  Visit Type: Health Maintenance    General Health: good    Dental Regular visits: up to date    Vision Problems: denies    Last Vision Examination: up to date    Hearing loss: denies    Life Style  Healthy Diet: yes  Regular Exercise: yes, walks 4-5 miles per day  Weight Concerns: unable to lose last 15 pounds from pregnancy    Tobacco Use: denies  Alcohol Use: occasional  Drug Use: denies      Reproductive Health  Sexually Active: good  Contraception: tubal ligation  Menstrual Problems:       Chief Complaint   Patient presents with    Physical Exam     lj       NP, here to establish and for CPE  Has gyn for pap smears and is up to date  Has two young children (ages 3 and 1) and feels intermittently frustrated and almost anxious  It is not every day but she is very distressed  Felt like she was a very patient person before having kids  Denies depressive symptoms  Does not feel that it is bad enough to start medication  The following portions of the patient's history were reviewed and updated as appropriate: allergies, current medications, past family history, past medical history, past social history, past surgical history and problem list       Review of Systems   Constitutional: Negative  HENT: Negative  Eyes: Negative  Respiratory: Negative  Cardiovascular: Negative  Gastrointestinal: Negative  Endocrine: Negative  Genitourinary: Negative  Musculoskeletal: Negative  Skin: Negative  Allergic/Immunologic: Negative  Neurological: Negative  Hematological: Negative  Psychiatric/Behavioral: The patient is nervous/anxious  Objective:    History   Smoking Status    Never Smoker   Smokeless Tobacco    Never Used       Allergies: Allergies   Allergen Reactions    Shrimp Extract Allergy Skin Test Anaphylaxis       Vitals:  /68   Pulse 72   Temp 97 6 °F (36 4 °C)   Resp 16   Ht 5' 3" (1 6 m)   Wt 58 5 kg (129 lb)   LMP 08/25/2018   BMI 22 85 kg/m²     No current outpatient prescriptions on file  No current facility-administered medications for this visit  Physical Exam   Constitutional: She is oriented to person, place, and time  She appears well-developed and well-nourished  No distress  HENT:   Head: Normocephalic and atraumatic     Right Ear: External ear normal    Left Ear: External ear normal    Mouth/Throat: Oropharynx is clear and moist    Eyes: Conjunctivae and EOM are normal    Neck: Normal range of motion  Neck supple  No JVD present  No thyromegaly present  Cardiovascular: Normal rate, regular rhythm, normal heart sounds and intact distal pulses  Exam reveals no gallop and no friction rub  No murmur heard  Pulmonary/Chest: Effort normal and breath sounds normal  She has no wheezes  She has no rales  Abdominal: Soft  Bowel sounds are normal  She exhibits no distension and no mass  There is no tenderness  There is no rebound  Musculoskeletal: Normal range of motion  She exhibits no edema or deformity  Lymphadenopathy:     She has no cervical adenopathy  Neurological: She is alert and oriented to person, place, and time  She has normal reflexes  No cranial nerve deficit  She exhibits normal muscle tone  Coordination normal    Skin: Skin is warm and dry  No rash noted  She is not diaphoretic  Psychiatric: She has a normal mood and affect   Her behavior is normal  Judgment and thought content normal          Jules Bedolla MD

## 2018-08-31 LAB
ALBUMIN SERPL-MCNC: 4.6 G/DL (ref 3.5–5.5)
ALBUMIN/GLOB SERPL: 1.8 {RATIO} (ref 1.2–2.2)
ALP SERPL-CCNC: 47 IU/L (ref 39–117)
ALT SERPL-CCNC: 15 IU/L (ref 0–32)
AMBIG ABBREV DEFAULT: NORMAL
AMBIG ABBREV DEFAULT: NORMAL
AST SERPL-CCNC: 15 IU/L (ref 0–40)
BASOPHILS # BLD AUTO: 0 X10E3/UL (ref 0–0.2)
BASOPHILS NFR BLD AUTO: 0 %
BILIRUB SERPL-MCNC: 1.5 MG/DL (ref 0–1.2)
BUN SERPL-MCNC: 14 MG/DL (ref 6–20)
BUN/CREAT SERPL: 17 (ref 9–23)
CALCIUM SERPL-MCNC: 9.2 MG/DL (ref 8.7–10.2)
CHLORIDE SERPL-SCNC: 102 MMOL/L (ref 96–106)
CHOLEST SERPL-MCNC: 162 MG/DL (ref 100–199)
CO2 SERPL-SCNC: 25 MMOL/L (ref 20–29)
CREAT SERPL-MCNC: 0.83 MG/DL (ref 0.57–1)
EOSINOPHIL # BLD AUTO: 0.2 X10E3/UL (ref 0–0.4)
EOSINOPHIL NFR BLD AUTO: 3 %
ERYTHROCYTE [DISTWIDTH] IN BLOOD BY AUTOMATED COUNT: 13.2 % (ref 12.3–15.4)
EST. AVERAGE GLUCOSE BLD GHB EST-MCNC: 103 MG/DL
GLOBULIN SER-MCNC: 2.5 G/DL (ref 1.5–4.5)
GLUCOSE SERPL-MCNC: 103 MG/DL (ref 65–99)
HBA1C MFR BLD: 5.2 % (ref 4.8–5.6)
HCT VFR BLD AUTO: 40.8 % (ref 34–46.6)
HDLC SERPL-MCNC: 57 MG/DL
HGB BLD-MCNC: 14 G/DL (ref 11.1–15.9)
IMM GRANULOCYTES # BLD: 0 X10E3/UL (ref 0–0.1)
IMM GRANULOCYTES NFR BLD: 0 %
LDLC SERPL CALC-MCNC: 89 MG/DL (ref 0–99)
LYMPHOCYTES # BLD AUTO: 2.2 X10E3/UL (ref 0.7–3.1)
LYMPHOCYTES NFR BLD AUTO: 42 %
MCH RBC QN AUTO: 29.3 PG (ref 26.6–33)
MCHC RBC AUTO-ENTMCNC: 34.3 G/DL (ref 31.5–35.7)
MCV RBC AUTO: 85 FL (ref 79–97)
MICRODELETION SYND BLD/T FISH: NORMAL
MONOCYTES # BLD AUTO: 0.4 X10E3/UL (ref 0.1–0.9)
MONOCYTES NFR BLD AUTO: 7 %
NEUTROPHILS # BLD AUTO: 2.4 X10E3/UL (ref 1.4–7)
NEUTROPHILS NFR BLD AUTO: 48 %
PLATELET # BLD AUTO: 162 X10E3/UL (ref 150–379)
POTASSIUM SERPL-SCNC: 4.2 MMOL/L (ref 3.5–5.2)
PROT SERPL-MCNC: 7.1 G/DL (ref 6–8.5)
RBC # BLD AUTO: 4.78 X10E6/UL (ref 3.77–5.28)
SL AMB EGFR AFRICAN AMERICAN: 104 ML/MIN/1.73
SL AMB EGFR NON AFRICAN AMERICAN: 90 ML/MIN/1.73
SL AMB VLDL CHOLESTEROL CALC: 16 MG/DL (ref 5–40)
SODIUM SERPL-SCNC: 141 MMOL/L (ref 134–144)
TRIGL SERPL-MCNC: 78 MG/DL (ref 0–149)
TSH SERPL DL<=0.005 MIU/L-ACNC: 1.64 UIU/ML (ref 0.45–4.5)
WBC # BLD AUTO: 5.1 X10E3/UL (ref 3.4–10.8)

## 2019-02-27 ENCOUNTER — OFFICE VISIT (OUTPATIENT)
Dept: FAMILY MEDICINE CLINIC | Facility: CLINIC | Age: 39
End: 2019-02-27
Payer: COMMERCIAL

## 2019-02-27 VITALS
WEIGHT: 125 LBS | TEMPERATURE: 97.3 F | SYSTOLIC BLOOD PRESSURE: 98 MMHG | RESPIRATION RATE: 16 BRPM | DIASTOLIC BLOOD PRESSURE: 66 MMHG | BODY MASS INDEX: 22.15 KG/M2 | HEART RATE: 84 BPM | HEIGHT: 63 IN

## 2019-02-27 DIAGNOSIS — J31.0 CHRONIC RHINITIS: ICD-10-CM

## 2019-02-27 DIAGNOSIS — H69.82 DYSFUNCTION OF LEFT EUSTACHIAN TUBE: Primary | ICD-10-CM

## 2019-02-27 PROCEDURE — 99213 OFFICE O/P EST LOW 20 MIN: CPT | Performed by: NURSE PRACTITIONER

## 2019-02-27 PROCEDURE — 3008F BODY MASS INDEX DOCD: CPT | Performed by: NURSE PRACTITIONER

## 2019-02-27 RX ORDER — FLUTICASONE PROPIONATE 50 MCG
2 SPRAY, SUSPENSION (ML) NASAL DAILY
Qty: 16 G | Refills: 3 | Status: SHIPPED | OUTPATIENT
Start: 2019-02-27

## 2019-02-27 RX ORDER — PREDNISONE 50 MG/1
50 TABLET ORAL DAILY
Qty: 5 TABLET | Refills: 0 | Status: SHIPPED | OUTPATIENT
Start: 2019-02-27

## 2019-02-27 RX ORDER — VALACYCLOVIR HYDROCHLORIDE 500 MG/1
1 TABLET, FILM COATED ORAL DAILY
COMMUNITY

## 2019-02-27 NOTE — PROGRESS NOTES
Assessment/Plan:    Will treat with Prednisone burst and start Flonase for chronic rhinitis  RTO prn  Problem List Items Addressed This Visit        Respiratory    Chronic rhinitis      Other Visit Diagnoses     Dysfunction of left eustachian tube    -  Primary    Relevant Medications    predniSONE 50 mg tablet    fluticasone (FLONASE) 50 mcg/act nasal spray          There are no Patient Instructions on file for this visit  Return if symptoms worsen or fail to improve  Subjective:      Patient ID: Tarsha Morales is a 41 y o  female  Chief Complaint   Patient presents with   Nik Morelos     [facility] --lj       Here today with complaints of left ear pain for the past couple of days  She has had chronic sinus congestion all season long  Denies worsening congestion, pressure, cough, fever, or right ear pain  She took Advil which didn't seem to help      The following portions of the patient's history were reviewed and updated as appropriate: allergies, current medications, past family history, past medical history, past social history, past surgical history and problem list     Review of Systems   Constitutional: Negative for chills, fatigue and fever  HENT: Positive for congestion and ear pain  Negative for postnasal drip, rhinorrhea, sinus pressure and sore throat  Respiratory: Negative for cough, shortness of breath and wheezing  Cardiovascular: Negative for chest pain  Gastrointestinal: Negative for abdominal pain, diarrhea, nausea and vomiting  Musculoskeletal: Negative for arthralgias  Skin: Negative for rash  Neurological: Negative for headaches           Current Outpatient Medications   Medication Sig Dispense Refill    valACYclovir (VALTREX) 500 mg tablet 1 capsule Daily      fluticasone (FLONASE) 50 mcg/act nasal spray 2 sprays into each nostril daily 16 g 3    predniSONE 50 mg tablet Take 1 tablet (50 mg total) by mouth daily 5 tablet 0     No current facility-administered medications for this visit  Objective:    BP 98/66   Pulse 84   Temp (!) 97 3 °F (36 3 °C)   Resp 16   Ht 5' 3" (1 6 m)   Wt 56 7 kg (125 lb)   BMI 22 14 kg/m²        Physical Exam   Constitutional: She appears well-developed and well-nourished  HENT:   Head: Normocephalic and atraumatic  Right Ear: Tympanic membrane, external ear and ear canal normal    Left Ear: External ear and ear canal normal  Tympanic membrane is bulging  Tympanic membrane is not erythematous  Nose: Mucosal edema (L>R) and rhinorrhea present  Mouth/Throat: Uvula is midline, oropharynx is clear and moist and mucous membranes are normal    Eyes: Conjunctivae are normal    Neck: Neck supple  No edema present  No thyromegaly present  Cardiovascular: Normal rate, regular rhythm, normal heart sounds and intact distal pulses  No murmur heard  Pulmonary/Chest: Effort normal and breath sounds normal    Abdominal: Bowel sounds are normal  She exhibits no distension  There is no splenomegaly or hepatomegaly  There is no tenderness  Lymphadenopathy:        Right cervical: No superficial cervical adenopathy present  Left cervical: No superficial cervical adenopathy present  Skin: Skin is warm, dry and intact  No rash noted  Psychiatric: She has a normal mood and affect  Nursing note and vitals reviewed               Rex Keita

## (undated) DEVICE — UNIVERSAL GYN LAPAROSCOPY,KIT: Brand: CARDINAL HEALTH

## (undated) DEVICE — GLOVE SRG BIOGEL 7

## (undated) DEVICE — SCD SEQUENTIAL COMPRESSION COMFORT SLEEVE MEDIUM KNEE LENGTH: Brand: KENDALL SCD

## (undated) DEVICE — SUT CHROMIC 2-0 SH 27 IN G123H

## (undated) DEVICE — BUTTON SWITCH PENCIL HOLSTER: Brand: VALLEYLAB

## (undated) DEVICE — MEDI-VAC YANKAUER SUCTION HANDLE W/BULBOUS AND CONTROL VENT: Brand: CARDINAL HEALTH

## (undated) DEVICE — BLUE HEAT SCOPE WARMER

## (undated) DEVICE — ENDOPATH XCEL UNIVERSAL TROCAR STABLILITY SLEEVES: Brand: ENDOPATH XCEL

## (undated) DEVICE — INTENDED FOR TISSUE SEPARATION, AND OTHER PROCEDURES THAT REQUIRE A SHARP SURGICAL BLADE TO PUNCTURE OR CUT.: Brand: BARD-PARKER SAFETY BLADES SIZE 11, STERILE

## (undated) DEVICE — SYRINGE 50ML LL

## (undated) DEVICE — REM POLYHESIVE ADULT PATIENT RETURN ELECTRODE: Brand: VALLEYLAB

## (undated) DEVICE — PMI DISPOSABLE PUNCTURE CLOSURE DEVICE / SUTURE GRASPER: Brand: PMI

## (undated) DEVICE — 10FR FRAZIER SUCTION HANDLE: Brand: CARDINAL HEALTH

## (undated) DEVICE — IV EXTENSION TUBING 33 IN

## (undated) DEVICE — CLOSURE DEVICE ENDO CLOSE

## (undated) DEVICE — GLOVE INDICATOR PI UNDERGLOVE SZ 7 BLUE

## (undated) DEVICE — PREMIUM DRY TRAY LF: Brand: MEDLINE INDUSTRIES, INC.

## (undated) DEVICE — FORCEPS PK CUTTING 5MM 33CM

## (undated) DEVICE — [HIGH FLOW INSUFFLATOR,  DO NOT USE IF PACKAGE IS DAMAGED,  KEEP DRY,  KEEP AWAY FROM SUNLIGHT,  PROTECT FROM HEAT AND RADIOACTIVE SOURCES.]: Brand: PNEUMOSURE

## (undated) DEVICE — ADHESIVE SKN CLSR HISTOACRYL FLEX 0.5ML LF

## (undated) DEVICE — CHLORAPREP HI-LITE 26ML ORANGE

## (undated) DEVICE — ENDOPATH XCEL BLADELESS TROCARS WITH STABILITY SLEEVES: Brand: ENDOPATH XCEL